# Patient Record
Sex: MALE | Race: WHITE | Employment: PART TIME | URBAN - METROPOLITAN AREA
[De-identification: names, ages, dates, MRNs, and addresses within clinical notes are randomized per-mention and may not be internally consistent; named-entity substitution may affect disease eponyms.]

---

## 2018-06-18 ENCOUNTER — HOSPITAL ENCOUNTER (OUTPATIENT)
Age: 73
Setting detail: OUTPATIENT SURGERY
Discharge: HOME | End: 2018-06-19
Attending: EMERGENCY MEDICINE | Admitting: COLON & RECTAL SURGERY
Payer: MEDICARE

## 2018-06-18 ENCOUNTER — HOSPITAL ENCOUNTER (OUTPATIENT)
Dept: GENERAL RADIOLOGY | Age: 73
Discharge: HOME | End: 2018-06-18
Payer: MEDICARE

## 2018-06-18 ENCOUNTER — ANESTHESIA EVENT (OUTPATIENT)
Dept: SURGERY | Age: 73
End: 2018-06-18
Payer: MEDICARE

## 2018-06-18 ENCOUNTER — ANESTHESIA (OUTPATIENT)
Dept: SURGERY | Age: 73
End: 2018-06-18
Payer: MEDICARE

## 2018-06-18 ENCOUNTER — APPOINTMENT (OUTPATIENT)
Dept: GENERAL RADIOLOGY | Age: 73
End: 2018-06-18
Attending: COLON & RECTAL SURGERY
Payer: MEDICARE

## 2018-06-18 DIAGNOSIS — T18.5XXA FOREIGN BODY IN ANUS AND RECTUM: ICD-10-CM

## 2018-06-18 DIAGNOSIS — T18.5XXA: Primary | ICD-10-CM

## 2018-06-18 LAB
ALBUMIN SERPL-MCNC: 3.7 G/DL (ref 3.5–5)
ALBUMIN/GLOB SERPL: 1 {RATIO} (ref 1.1–2.2)
ALP SERPL-CCNC: 77 U/L (ref 45–117)
ALT SERPL-CCNC: 27 U/L (ref 12–78)
ANION GAP SERPL CALC-SCNC: 9 MMOL/L (ref 5–15)
AST SERPL-CCNC: 22 U/L (ref 15–37)
ATRIAL RATE: 46 BPM
BASOPHILS # BLD: 0 K/UL (ref 0–0.1)
BASOPHILS NFR BLD: 0 % (ref 0–1)
BILIRUB SERPL-MCNC: 0.7 MG/DL (ref 0.2–1)
BUN SERPL-MCNC: 14 MG/DL (ref 6–20)
BUN/CREAT SERPL: 15 (ref 12–20)
CALCIUM SERPL-MCNC: 8.3 MG/DL (ref 8.5–10.1)
CALCULATED P AXIS, ECG09: -6 DEGREES
CALCULATED R AXIS, ECG10: -42 DEGREES
CALCULATED T AXIS, ECG11: -12 DEGREES
CHLORIDE SERPL-SCNC: 105 MMOL/L (ref 97–108)
CO2 SERPL-SCNC: 26 MMOL/L (ref 21–32)
CREAT SERPL-MCNC: 0.91 MG/DL (ref 0.7–1.3)
DIAGNOSIS, 93000: NORMAL
DIFFERENTIAL METHOD BLD: NORMAL
EOSINOPHIL # BLD: 0.2 K/UL (ref 0–0.4)
EOSINOPHIL NFR BLD: 2 % (ref 0–7)
ERYTHROCYTE [DISTWIDTH] IN BLOOD BY AUTOMATED COUNT: 13.2 % (ref 11.5–14.5)
GLOBULIN SER CALC-MCNC: 3.7 G/DL (ref 2–4)
GLUCOSE SERPL-MCNC: 87 MG/DL (ref 65–100)
HCT VFR BLD AUTO: 40.4 % (ref 36.6–50.3)
HGB BLD-MCNC: 13.4 G/DL (ref 12.1–17)
IMM GRANULOCYTES # BLD: 0 K/UL (ref 0–0.04)
IMM GRANULOCYTES NFR BLD AUTO: 0 % (ref 0–0.5)
LYMPHOCYTES # BLD: 2 K/UL (ref 0.8–3.5)
LYMPHOCYTES NFR BLD: 23 % (ref 12–49)
MCH RBC QN AUTO: 31.1 PG (ref 26–34)
MCHC RBC AUTO-ENTMCNC: 33.2 G/DL (ref 30–36.5)
MCV RBC AUTO: 93.7 FL (ref 80–99)
MONOCYTES # BLD: 0.8 K/UL (ref 0–1)
MONOCYTES NFR BLD: 9 % (ref 5–13)
NEUTS SEG # BLD: 5.6 K/UL (ref 1.8–8)
NEUTS SEG NFR BLD: 65 % (ref 32–75)
NRBC # BLD: 0 K/UL (ref 0–0.01)
NRBC BLD-RTO: 0 PER 100 WBC
P-R INTERVAL, ECG05: 154 MS
PLATELET # BLD AUTO: 189 K/UL (ref 150–400)
PMV BLD AUTO: 11 FL (ref 8.9–12.9)
POTASSIUM SERPL-SCNC: 3.9 MMOL/L (ref 3.5–5.1)
PROT SERPL-MCNC: 7.4 G/DL (ref 6.4–8.2)
Q-T INTERVAL, ECG07: 470 MS
QRS DURATION, ECG06: 82 MS
QTC CALCULATION (BEZET), ECG08: 411 MS
RBC # BLD AUTO: 4.31 M/UL (ref 4.1–5.7)
SODIUM SERPL-SCNC: 140 MMOL/L (ref 136–145)
VENTRICULAR RATE, ECG03: 46 BPM
WBC # BLD AUTO: 8.7 K/UL (ref 4.1–11.1)

## 2018-06-18 PROCEDURE — 77030032490 HC SLV COMPR SCD KNE COVD -B: Performed by: COLON & RECTAL SURGERY

## 2018-06-18 PROCEDURE — 77030018836 HC SOL IRR NACL ICUM -A: Performed by: COLON & RECTAL SURGERY

## 2018-06-18 PROCEDURE — 36415 COLL VENOUS BLD VENIPUNCTURE: CPT | Performed by: EMERGENCY MEDICINE

## 2018-06-18 PROCEDURE — 74019 RADEX ABDOMEN 2 VIEWS: CPT

## 2018-06-18 PROCEDURE — 93005 ELECTROCARDIOGRAM TRACING: CPT | Performed by: ANESTHESIOLOGY

## 2018-06-18 PROCEDURE — 80053 COMPREHEN METABOLIC PANEL: CPT | Performed by: EMERGENCY MEDICINE

## 2018-06-18 PROCEDURE — 77030005546 HC CATH URETH FOL 3W BARD -A: Performed by: COLON & RECTAL SURGERY

## 2018-06-18 PROCEDURE — 76010000149 HC OR TIME 1 TO 1.5 HR: Performed by: COLON & RECTAL SURGERY

## 2018-06-18 PROCEDURE — 74011250636 HC RX REV CODE- 250/636

## 2018-06-18 PROCEDURE — 77030010545: Performed by: COLON & RECTAL SURGERY

## 2018-06-18 PROCEDURE — 76210000022 HC REC RM PH II 1.5 TO 2 HR: Performed by: COLON & RECTAL SURGERY

## 2018-06-18 PROCEDURE — 76060000033 HC ANESTHESIA 1 TO 1.5 HR: Performed by: COLON & RECTAL SURGERY

## 2018-06-18 PROCEDURE — 99285 EMERGENCY DEPT VISIT HI MDM: CPT

## 2018-06-18 PROCEDURE — 76210000000 HC OR PH I REC 2 TO 2.5 HR: Performed by: COLON & RECTAL SURGERY

## 2018-06-18 PROCEDURE — 71045 X-RAY EXAM CHEST 1 VIEW: CPT

## 2018-06-18 PROCEDURE — 85025 COMPLETE CBC W/AUTO DIFF WBC: CPT | Performed by: EMERGENCY MEDICINE

## 2018-06-18 PROCEDURE — 77030014006 HC SPNG HEMSTAT J&J -A: Performed by: COLON & RECTAL SURGERY

## 2018-06-18 PROCEDURE — 74011250636 HC RX REV CODE- 250/636: Performed by: ANESTHESIOLOGY

## 2018-06-18 PROCEDURE — 74011000250 HC RX REV CODE- 250

## 2018-06-18 PROCEDURE — 77030007866 HC KT SPN ANES BBMI -B: Performed by: ANESTHESIOLOGY

## 2018-06-18 RX ORDER — MIDAZOLAM HYDROCHLORIDE 1 MG/ML
0.5 INJECTION, SOLUTION INTRAMUSCULAR; INTRAVENOUS
Status: DISCONTINUED | OUTPATIENT
Start: 2018-06-18 | End: 2018-06-18

## 2018-06-18 RX ORDER — SODIUM CHLORIDE, SODIUM LACTATE, POTASSIUM CHLORIDE, CALCIUM CHLORIDE 600; 310; 30; 20 MG/100ML; MG/100ML; MG/100ML; MG/100ML
100 INJECTION, SOLUTION INTRAVENOUS CONTINUOUS
Status: DISCONTINUED | OUTPATIENT
Start: 2018-06-18 | End: 2018-06-19 | Stop reason: HOSPADM

## 2018-06-18 RX ORDER — FENTANYL CITRATE 50 UG/ML
25 INJECTION, SOLUTION INTRAMUSCULAR; INTRAVENOUS
Status: DISCONTINUED | OUTPATIENT
Start: 2018-06-18 | End: 2018-06-18

## 2018-06-18 RX ORDER — ACETAMINOPHEN 500 MG
1000 TABLET ORAL ONCE
Status: COMPLETED | OUTPATIENT
Start: 2018-06-18 | End: 2018-06-19

## 2018-06-18 RX ORDER — SODIUM CHLORIDE 0.9 % (FLUSH) 0.9 %
5-10 SYRINGE (ML) INJECTION AS NEEDED
Status: DISCONTINUED | OUTPATIENT
Start: 2018-06-18 | End: 2018-06-19 | Stop reason: HOSPADM

## 2018-06-18 RX ORDER — LIDOCAINE HYDROCHLORIDE 10 MG/ML
0.1 INJECTION, SOLUTION EPIDURAL; INFILTRATION; INTRACAUDAL; PERINEURAL AS NEEDED
Status: DISCONTINUED | OUTPATIENT
Start: 2018-06-18 | End: 2018-06-18 | Stop reason: HOSPADM

## 2018-06-18 RX ORDER — SODIUM CHLORIDE 0.9 % (FLUSH) 0.9 %
5-10 SYRINGE (ML) INJECTION AS NEEDED
Status: DISCONTINUED | OUTPATIENT
Start: 2018-06-18 | End: 2018-06-18 | Stop reason: HOSPADM

## 2018-06-18 RX ORDER — SODIUM CHLORIDE 0.9 % (FLUSH) 0.9 %
5-10 SYRINGE (ML) INJECTION EVERY 8 HOURS
Status: DISCONTINUED | OUTPATIENT
Start: 2018-06-18 | End: 2018-06-18 | Stop reason: HOSPADM

## 2018-06-18 RX ORDER — BUPIVACAINE HYDROCHLORIDE AND EPINEPHRINE 2.5; 5 MG/ML; UG/ML
30 INJECTION, SOLUTION EPIDURAL; INFILTRATION; INTRACAUDAL; PERINEURAL ONCE
Status: DISCONTINUED | OUTPATIENT
Start: 2018-06-18 | End: 2018-06-18 | Stop reason: HOSPADM

## 2018-06-18 RX ORDER — MIDAZOLAM HYDROCHLORIDE 1 MG/ML
1 INJECTION, SOLUTION INTRAMUSCULAR; INTRAVENOUS AS NEEDED
Status: DISCONTINUED | OUTPATIENT
Start: 2018-06-18 | End: 2018-06-18 | Stop reason: HOSPADM

## 2018-06-18 RX ORDER — FENTANYL CITRATE 50 UG/ML
50 INJECTION, SOLUTION INTRAMUSCULAR; INTRAVENOUS AS NEEDED
Status: DISCONTINUED | OUTPATIENT
Start: 2018-06-18 | End: 2018-06-18 | Stop reason: HOSPADM

## 2018-06-18 RX ORDER — ROPIVACAINE HYDROCHLORIDE 5 MG/ML
150 INJECTION, SOLUTION EPIDURAL; INFILTRATION; PERINEURAL AS NEEDED
Status: DISCONTINUED | OUTPATIENT
Start: 2018-06-18 | End: 2018-06-18 | Stop reason: HOSPADM

## 2018-06-18 RX ORDER — BUPIVACAINE HYDROCHLORIDE 7.5 MG/ML
INJECTION, SOLUTION EPIDURAL; RETROBULBAR AS NEEDED
Status: DISCONTINUED | OUTPATIENT
Start: 2018-06-18 | End: 2018-06-18 | Stop reason: HOSPADM

## 2018-06-18 RX ORDER — SODIUM CHLORIDE, SODIUM LACTATE, POTASSIUM CHLORIDE, CALCIUM CHLORIDE 600; 310; 30; 20 MG/100ML; MG/100ML; MG/100ML; MG/100ML
100 INJECTION, SOLUTION INTRAVENOUS CONTINUOUS
Status: DISCONTINUED | OUTPATIENT
Start: 2018-06-18 | End: 2018-06-18 | Stop reason: HOSPADM

## 2018-06-18 RX ORDER — ACETAMINOPHEN 500 MG
1000 TABLET ORAL ONCE
Status: DISCONTINUED | OUTPATIENT
Start: 2018-06-19 | End: 2018-06-18

## 2018-06-18 RX ORDER — DIPHENHYDRAMINE HYDROCHLORIDE 50 MG/ML
12.5 INJECTION, SOLUTION INTRAMUSCULAR; INTRAVENOUS AS NEEDED
Status: ACTIVE | OUTPATIENT
Start: 2018-06-18 | End: 2018-06-18

## 2018-06-18 RX ADMIN — SODIUM CHLORIDE, SODIUM LACTATE, POTASSIUM CHLORIDE, AND CALCIUM CHLORIDE 100 ML/HR: 600; 310; 30; 20 INJECTION, SOLUTION INTRAVENOUS at 21:46

## 2018-06-18 RX ADMIN — BUPIVACAINE HYDROCHLORIDE 7 MG: 7.5 INJECTION, SOLUTION EPIDURAL; RETROBULBAR at 21:59

## 2018-06-18 NOTE — ED PROVIDER NOTES
HPI Comments: 71 y.o. male with past medical history significant for DJD who presents from home via private vehicle with chief complaint of rectal pain. Pt reports putting an electric nose clipper, without the battery, up his rectum 2 days ago and has not been able to retrieve it. Pt reports mild discomfort, but no significant pain. Pt reports having a pre-established appointment today at his PCP's office, at which he mentioned the object, had an X-Ray, and was referred here. Pt reports trouble passing his stool, but denies any nausea or vomiting. Pt denies any abdominal pain, fever, chills, or diaphoresis. There are no other acute medical concerns at this time. Social hx: Former smoker; Occasional EtOH use; Benzodiazepine use  PCP: Anselm Primrose, MD    Note written by Cosme Owen, as dictated by Leonor Mendez MD 2:43 PM    The history is provided by the patient. No  was used. Past Medical History:   Diagnosis Date    B12 deficiency     H/O    Chicken pox     DJD (degenerative joint disease)     Hypercholesterolemia     Sciatica     Shingles     H/O       Past Surgical History:   Procedure Laterality Date    ENDOSCOPY, COLON, DIAGNOSTIC  2003    normal    HX HEENT      repair nasal fx         Family History:   Problem Relation Age of Onset    Thyroid Disease Mother      hypothyroidism    Hypertension Mother     Thyroid Disease Sister      hypothyroidism    Diabetes Father     Hypertension Father     Stroke Father        Social History     Social History    Marital status: SINGLE     Spouse name: N/A    Number of children: N/A    Years of education: N/A     Occupational History    Not on file.      Social History Main Topics    Smoking status: Former Smoker    Smokeless tobacco: Never Used    Alcohol use 0.0 oz/week     0 Standard drinks or equivalent per week      Comment: 1-2    Drug use: Yes     Special: Benzodiazepines    Sexual activity: Not Currently     Other Topics Concern    Not on file     Social History Narrative         ALLERGIES: Pcn [penicillins]    Review of Systems   Constitutional: Negative for activity change, appetite change, chills, diaphoresis, fatigue and fever. HENT: Negative for ear pain, facial swelling, sore throat and trouble swallowing. Eyes: Negative for pain, discharge and visual disturbance. Respiratory: Negative for chest tightness, shortness of breath and wheezing. Cardiovascular: Negative for chest pain and palpitations. Gastrointestinal: Positive for rectal pain. Negative for abdominal pain, blood in stool, nausea and vomiting. Genitourinary: Negative for difficulty urinating, flank pain and hematuria. Musculoskeletal: Negative for arthralgias, joint swelling, myalgias and neck pain. Skin: Negative for color change and rash. Neurological: Negative for dizziness, weakness, numbness and headaches. Hematological: Negative for adenopathy. Does not bruise/bleed easily. Psychiatric/Behavioral: Negative for behavioral problems, confusion and sleep disturbance. All other systems reviewed and are negative. Vitals:    06/18/18 1403   BP: 101/65   Pulse: (!) 51   Resp: 15   Temp: 97.9 °F (36.6 °C)   SpO2: 97%   Weight: 73.5 kg (162 lb)   Height: 5' 6\" (1.676 m)            Physical Exam   Constitutional: He is oriented to person, place, and time. He appears well-developed and well-nourished. No distress. HENT:   Head: Normocephalic and atraumatic. Nose: Nose normal.   Mouth/Throat: Oropharynx is clear and moist.   Eyes: Conjunctivae and EOM are normal. Pupils are equal, round, and reactive to light. No scleral icterus. Neck: Normal range of motion. Neck supple. No JVD present. No tracheal deviation present. No thyromegaly present. No carotid bruits noted. Cardiovascular: Normal rate, regular rhythm, normal heart sounds and intact distal pulses.   Exam reveals no gallop and no friction rub. No murmur heard. Pulmonary/Chest: Effort normal and breath sounds normal. No respiratory distress. He has no wheezes. He has no rales. He exhibits no tenderness. Abdominal: Soft. Bowel sounds are normal. He exhibits no distension and no mass. There is no tenderness. There is no rebound and no guarding. Genitourinary:   Genitourinary Comments: Could not visualize the object. Musculoskeletal: Normal range of motion. He exhibits no edema or tenderness. Lymphadenopathy:     He has no cervical adenopathy. Neurological: He is alert and oriented to person, place, and time. He has normal reflexes. No cranial nerve deficit. Coordination normal.   Skin: Skin is warm and dry. No rash noted. No erythema. Psychiatric: He has a normal mood and affect. His behavior is normal. Judgment and thought content normal.   Nursing note and vitals reviewed. Note written by Cosme Barros, as dictated by Yuval Valle MD 2:43 PM    MDM  Number of Diagnoses or Management Options     Amount and/or Complexity of Data Reviewed  Clinical lab tests: ordered and reviewed  Tests in the radiology section of CPT®: ordered and reviewed  Decide to obtain previous medical records or to obtain history from someone other than the patient: yes  Review and summarize past medical records: yes  Discuss the patient with other providers: yes  Independent visualization of images, tracings, or specimens: yes    Risk of Complications, Morbidity, and/or Mortality  Presenting problems: low  Diagnostic procedures: low  Management options: low    Patient Progress  Patient progress: stable        ED Course       Procedures      PROGRESS NOTE:  2:53 PM  Reviewed pt's X-Ray. Will consult colo-rectal and will do a rectal exam, but am hesitant to attempt retrieval at this moment, due to where the object is located. CONSULT NOTE:  3:46 PM Yuval Valle MD spoke with Dr. Fred Barrios, Consult for [facility]. Discussed available diagnostic tests and clinical findings. Dr. Nadya Romero will be by to see the pt this evening, around 1700. PROGRESS NOTE:  5:53 PM  Dr. Nadya Romero in ED, has seen the pt. He would like to remove the object in the OR with a spinal, which is not available until after 2000. States the pt can wait here in a stretcher, does not need to be admitted.

## 2018-06-18 NOTE — ED NOTES
2:00 PM  I have evaluated the patient as the Provider in Triage. I have reviewed His vital signs and the triage nurse assessment. I have talked with the patient and any available family and advised that I am the provider in triage and have ordered the appropriate study to initiate their work up based on the clinical presentation during my assessment. I have advised that the patient will be accommodated in the Main ED as soon as possible. I have also requested to contact the triage nurse or myself immediately if the patient experiences any changes in their condition during this brief waiting period. Beena Wilhelm NP    Pt states that he put an electric nose clipper in his rectum on Saturday. He went to his PCP office this morning and was sent for an outpatient xray which verified the foreign body in rectum. Pt denies any pain.

## 2018-06-18 NOTE — IP AVS SNAPSHOT
[address] 
[phone] Patient: Eneida Lama MRN: [id number] A check regina indicates which time of day the medication should be taken. My Medications CONTINUE taking these medications Instructions Each Dose to Equal  
 Morning Noon Evening Bedtime B COMPLEX 1 tablet Generic drug:  b complex vitamins Your last dose was: Your next dose is: Take 1 Tab by mouth daily. 1 Tab  
    
   
   
   
  
 cetirizine 10 mg tablet Commonly known as:  ZYRTEC Your last dose was: Your next dose is: Take 1 Tab by mouth daily. 10 mg  
    
   
   
   
  
 fexofenadine 180 mg tablet Commonly known as:  Harjinder Toth Your last dose was: Your next dose is: Take 1 Tab by mouth daily. 180 mg  
    
   
   
   
  
 fluocinoNIDE 0.05 % ointment Commonly known as:  LIDEX Your last dose was: Your next dose is:    
   
   
 Apply  to affected area two (2) times a day. mometasone 0.1 % topical cream  
Commonly known as:  Neva Christelle Your last dose was: Your next dose is:    
   
   
      
   
   
   
  
 TINACTIN 1 % Spra Generic drug:  tolnaftate Your last dose was: Your next dose is:    
   
   
 by Apply Externally route. STOP taking these medications   
 aspirin delayed-release 81 mg tablet Commonly known as:  Aspirin EC  
   
  
 FISH OIL 1,000 mg Cap Generic drug:  omega-3 fatty acids-vitamin e  
   
  
 niacin 1,000 mg Tb24 tab Commonly known as:  Tyler Brigitte

## 2018-06-18 NOTE — IP AVS SNAPSHOT
[address] 
[phone] Patient: Maynor Jordan MRN: [id number] [id number] About your hospitalization You were admitted on:  N/A You last received care in the:  [facility] PACU You were discharged on:  June 19, 2018 Why you were hospitalized Your primary diagnosis was:  Foreign Body In Anus And Rectum Follow-up Information Follow up With Details Comments Contact Info Derian Bueno, [address] [facility] [address] [address] 
[phone] Discharge Orders None A check regina indicates which time of day the medication should be taken. My Medications CONTINUE taking these medications Instructions Each Dose to Equal  
 Morning Noon Evening Bedtime B COMPLEX 1 tablet Generic drug:  b complex vitamins Your last dose was: Your next dose is: Take 1 Tab by mouth daily. 1 Tab  
    
   
   
   
  
 cetirizine 10 mg tablet Commonly known as:  ZYRTEC Your last dose was: Your next dose is: Take 1 Tab by mouth daily. 10 mg  
    
   
   
   
  
 fexofenadine 180 mg tablet Commonly known as:  Leona Lapidus Your last dose was: Your next dose is: Take 1 Tab by mouth daily. 180 mg  
    
   
   
   
  
 fluocinoNIDE 0.05 % ointment Commonly known as:  LIDEX Your last dose was: Your next dose is:    
   
   
 Apply  to affected area two (2) times a day. mometasone 0.1 % topical cream  
Commonly known as:  Ana Saupe Your last dose was: Your next dose is:    
   
   
      
   
   
   
  
 TINACTIN 1 % Spra Generic drug:  tolnaftate Your last dose was: Your next dose is:    
   
   
 by Apply Externally route. STOP taking these medications   
 aspirin delayed-release 81 mg tablet Commonly known as:  Aspirin EC  
   
  
 FISH OIL 1,000 mg Cap Generic drug:  omega-3 fatty acids-vitamin e  
   
  
 niacin 1,000 mg Tb24 tab Commonly known as:  Esmeralda Nina Discharge Instructions Post-Operative Instructions 1. Diet:  Consume a high fiber diet as tolerated. Such a diet may include fresh fruits, vegetables, and whole grain cereals and breads. You should also drink 8-10 glasses of water, juice, or other non-alcoholic beverages per day. 2. Fiber Supplements:  Take 1 dose of Metamucil or other over-the-counter fiber supplement (Citrucel, BeneFiber, etc.) as directed each morning and another dose each evening. 3. Medications: For pain, take Tylenol (acetaminophen) 1000 mg every 6 hours as needed. Take docusate (non-prescription stool softener) 100 mg twice per day for the next week. Beginning on the second day after surgery, you may take ibuprofen as directed in addition to or instead of the prescription medication if there has been no significant bleeding. Do not take pain medication on an empty stomach. Do not take aspirin, fish oil, or niacin for 10 days following the procedure. 4. Activity:  Do not engage in any heavy lifting or other strenuous activity for the next 3 days. You may walk as much as you want, and you may climb stairs. Frequent walking will help prevent constipation. 5. Sitz Baths (soaking):  Beginning in the morning or after your next bowel movement, perform sitz baths 3 times per day and after bowel movements. The water should be very warm, and you should soak for 10 to 20 minutes at a time. The sitz baths are partly for hygiene and partly for comfort. For the next few days, do not wipe the anal area with dry toilet tissue; instead, use baby wipes. You may shower as you normally would. If desired, you may apply Neosporin, Neosporin + Pain Relief, or a non-prescription topical anesthetic such as Nupercainal ointment to the anus. 6. Constipation:  If more than one day passes without a bowel movement, take 1 Tablespoon of Milk of Magnesia. Repeat in 6 hours if you have no results. If taking the second dose of the Milk of Magnesia does not result in a bowel movement, take two 5 mg Dulcolax (bisacodyl) tablets. 7. Bleeding:  A small amount of bright red bleeding can be expected for the next several days. If bleeding appears to be continuous, call my office. 8. Other Problems:  If you experience intolerable pain, fever (temperature of 101 or more), or inability to urinate, call my office. 9. Questions: If you have any questions about your post-operative condition or care, call my office. 10. Work:  You may return to work when feeling able. 11. Follow-up:  No planned follow-up is needed. If you are having problems, you should call my office. Gaby Camacho M.D. 
[phone] Introducing [facility]! Kayla Gustafson introduces [employer] patient portal. Now you can access parts of your medical record, email your doctor's office, and request medication refills online. 1. In your internet browser, go to [email or web address] 2. Click on the First Time User? Click Here link in the Sign In box. You will see the New Member Sign Up page. 3. Enter your [employer] Access Code exactly as it appears below. You will not need to use this code after youve completed the sign-up process. If you do not sign up before the expiration date, you must request a new code. · [employer] Access Code: [email or web address] Expires: 9/16/2018  2:04 PM 
 
4. Enter the last four digits of your Social Security Number (xxxx) and Date of Birth (mm/dd/yyyy) as indicated and click Submit. You will be taken to the next sign-up page. 5. Create a Zood[employer] ID. This will be your [employer] login ID and cannot be changed, so think of one that is secure and easy to remember. 6. Create a [employer] password. You can change your password at any time. 7. Enter your Password Reset Question and Answer. This can be used at a later time if you forget your password. 8. Enter your e-mail address. You will receive e-mail notification when new information is available in [address] 9. Click Sign Up. You can now view and download portions of your medical record. 10. Click the Download Summary menu link to download a portable copy of your medical information. If you have questions, please visit the Frequently Asked Questions section of the [employer] website. Remember, [employer] is NOT to be used for urgent needs. For medical emergencies, dial 911. Now available from your iPhone and Android! Introducing Allen Schmidt As a [facility] patient, I wanted to make you aware of our electronic visit tool called Allen Schmidt. [employer]/7 allows you to connect within minutes with a medical provider 24 hours a day, seven days a week via a mobile device or tablet or logging into a secure website from your computer. You can access Allen Schmidt from anywhere in the [place]. A virtual visit might be right for you when you have a simple condition and feel like you just dont want to get out of bed, or cant get away from work for an appointment, when your regular [facility] provider is not available (evenings, weekends or holidays), or when youre out of town and need minor care. Electronic visits cost only $49 and if the [facility] JRKICKZ/FlexMinder provider determines a prescription is needed to treat your condition, one can be electronically transmitted to a nearby pharmacy*. Please take a moment to enroll today if you have not already done so. The enrollment process is free and takes just a few minutes. To enroll, please download the [employer] ivan to your tablet or phone, or visit [email or web address] to enroll on your computer. And, as an [address] [place] [address] patient with a [facility] account, the results of your visits will be scanned into your electronic medical record and your primary care provider will be able to view the scanned results. We urge you to continue to see your regular Loda Lessen provider for your ongoing medical care. And while your primary care provider may not be the one available when you seek a [employer] virtual visit, the peace of mind you get from getting a real diagnosis real time can be priceless. For more information on Allen Amaxa [employer]elia[employer], view our Frequently Asked Questions (FAQs) at [email or web address]. Sincerely, 
 
Finesse Eubanks MD 
Chief Medical Officer Austin Geno Islas *:  certain medications cannot be prescribed via [employer] Unresulted tests-please follow up with your PCP on these results Procedure/Test Authorizing Provider CBC WITH AUTOMATED DIFF Rizwan Patel MD  
 EKG, 12 LEAD, INITIAL Bee Taylor MD  
 METABOLIC PANEL, COMPREHENSIVE Rizwan Patel MD  
 SAMPLES BEING HELD Rizwan Patel MD  
 XR Lucrecia Cardona MD  
  
Providers Seen During Your Hospitalization Provider Specialty Primary office phone Rizwan Ptael MD Emergency Medicine [phone] Your Primary Care Physician (PCP) Primary Care Physician Office Phone Office Fax Arbie Needs [phone] You are allergic to the following Allergen Reactions Pcn (Penicillins) Other (comments) Red man syndrome IV form only Recent Documentation Height Weight BMI Smoking Status 1.676 m 73.5 kg 26.15 kg/m2 Former Smoker Emergency Contacts Name Discharge Info Relation [facility] [3] Other Relative [6] [phone] Patient Belongings The following personal items are in your possession at time of discharge: Dental Appliances:  (permenant)  Visual Aid: None          Jewelry: None  Clothing:  (belongings to PACU)       Personal Items Sent to Safe: watch, keys, cellphone, credit card x2 Please provide this summary of care documentation to your next provider. Signatures-by signing, you are acknowledging that this After Visit Summary has been reviewed with you and you have received a copy. Patient Signature:  ____________________________________________________________ Date:  ____________________________________________________________  
  
[facility] Provider Signature:  ____________________________________________________________ Date:  ____________________________________________________________

## 2018-06-18 NOTE — ED TRIAGE NOTES
Pt c/o rectal pain. Pt inserted electric nose clippers on Saturday in rectum and was not able to get it out. Pt had XR ordered from PCP office, Dr. Irwin Ricks, and was referred to ED.  Last BM yesterday

## 2018-06-18 NOTE — CONSULTS
Colorectal Surgery Consultation/Pre-Operative History and Physical Examination Note          NAME:  Amanda Robertson   :   516   MRN:   [id number]     Referring Physician:  Herlinda Street MD    Consultation Date: 2018    Chief Complaint:  Rectal foreign body. History of Present Illness: The patient is a 18-year-old male who inserted a battery operated nose hair shawna (without the batteries) into his rectum. He has since then been unable to get it out. He has no real discomfort, but his bowel movements have been less voluminous than normal.  He denies seeing any blood or having any fever or chills. He was evaluated elsewhere earlier today and then sent to the ER here. PMH:  Past Medical History:   Diagnosis Date    B12 deficiency     H/O    Chicken pox     DJD (degenerative joint disease)     Hypercholesterolemia     Sciatica        PSH:  Past Surgical History:   Procedure Laterality Date    ENDOSCOPY, COLON, DIAGNOSTIC      normal    HX COLONOSCOPY      Dr. Lucero Conteh.  HX HEENT      Repair nasal fx; circa .  HX OTHER SURGICAL      Left chest tube for traumatic pneumothorax; circa .  HX TONSILLECTOMY      Circa age 9. Home Medications:  Prior to Admission Medications   Prescriptions Last Dose Informant Patient Reported? Taking?   aspirin delayed-release (ASPIRIN EC) 81 mg tablet   No No   Sig: Take 1 Tab by mouth daily. b complex vitamins (B COMPLEX 1) tablet   Yes No   Sig: Take 1 Tab by mouth daily. cetirizine (ZYRTEC) 10 mg tablet   Yes No   Sig: Take 1 Tab by mouth daily. fexofenadine (ALLEGRA) 180 mg tablet   Yes No   Sig: Take 1 Tab by mouth daily. fluocinoNIDE (LIDEX) 0.05 % ointment   No No   Sig: Apply  to affected area two (2) times a day. mometasone (ELOCON) 0.1 % topical cream   Yes No   niacin (NIASPAN) 1,000 mg Tb24 tab   Yes No   Sig: Take  by mouth nightly.    omega-3 fatty acids-vitamin e (FISH OIL) 1,000 mg cap   Yes No   Sig: Take 1 capsule by mouth.   tolnaftate (TINACTIN) 1 % spra   Yes No   Sig: by Apply Externally route. Facility-Administered Medications: None       Hospital Medications:  No current facility-administered medications for this encounter. Current Outpatient Prescriptions   Medication Sig    mometasone (ELOCON) 0.1 % topical cream     omega-3 fatty acids-vitamin e (FISH OIL) 1,000 mg cap Take 1 capsule by mouth.  tolnaftate (TINACTIN) 1 % spra by Apply Externally route.  fluocinoNIDE (LIDEX) 0.05 % ointment Apply  to affected area two (2) times a day.  fexofenadine (ALLEGRA) 180 mg tablet Take 1 Tab by mouth daily.  b complex vitamins (B COMPLEX 1) tablet Take 1 Tab by mouth daily.  aspirin delayed-release (ASPIRIN EC) 81 mg tablet Take 1 Tab by mouth daily.  cetirizine (ZYRTEC) 10 mg tablet Take 1 Tab by mouth daily.  niacin (NIASPAN) 1,000 mg Tb24 tab Take  by mouth nightly. Allergies: Allergies   Allergen Reactions    Pcn [Penicillins] Other (comments)     Red man syndrome  IV form only       Family History:  Family History   Problem Relation Age of Onset    Thyroid Disease Mother      hypothyroidism    Hypertension Mother     Thyroid Disease Sister      hypothyroidism    Diabetes Father     Hypertension Father     Stroke Father        Social History:  Social History   Substance Use Topics    Smoking status: Former Smoker    Smokeless tobacco: Never Used    Alcohol use 0.0 oz/week     0 Standard drinks or equivalent per week      Comment: 1-2       Review of Systems:    Symptom Y/N Comments  Symptom Y/N Comments   Fever/Chills N   Chest Pain N    Cough    Abdominal Pain N    Sputum    Joint Pain     SOB/COFFEY N   Pruritis/Rash     Nausea/vomit N   Tolerating PT/OT     Diarrhea N   Tolerating Diet  NPO since 9:30 AM today.    Constipation Y   Other       Could NOT obtain due to:        Objective:     Patient Vitals for the past 8 hrs:   BP Temp Pulse Resp SpO2 Height Weight   06/18/18 1403 101/65 97.9 °F (36.6 °C) (!) 51 15 97 % 5' 6\" (1.676 m) 73.5 kg (162 lb)             PHYSICAL EXAMINATION:    GENERAL:  No distress. HEENT:  Anicteric. LUNGS:  Clear to auscultation bilaterally. HEART:  Regular rate and rhythm with no murmurs, gallops, or rubs. ABDOMEN:  Soft. Non-tender. Non-distended. No palpable masses  PERINEUM:  Grossly normal anal closure. Stool and a foreign body palpable within the distal rectum on digital examination. No gross blood. NEURO:  Alert and oriented. Data Review     Recent Labs      06/18/18   1622   WBC  8.7   HGB  13.4   HCT  40.4   PLT  189     Recent Labs      06/18/18   1622   NA  140   K  3.9   CL  105   CO2  26   BUN  14   CREA  0.91   GLU  87   CA  8.3*     Recent Labs      06/18/18   1622   SGOT  22   AP  77   TP  7.4   ALB  3.7   GLOB  3.7     No results for input(s): INR, PTP, APTT in the last 72 hours. No lab exists for component: INREXT, INREXT     Abdominal Radiographs (6/18/2018): Foreign body in rectum. Assessment and Plan:      The patient  Has a foreign body in the rectum that can hopefully be extracted transanally. I have recommended attempting this procedure in the operating room with spinal anesthesia and no sedation so that the patient can push if necessary. We have discussed the risks of the procedure, including but not limited to bleeding, infection, and injury to the colon, rectum, and/or anal sphincters possibly requiring laparotomy and the creation of a colostomy. He appeared to have understood and he has agreed to proceed. He has therefore been added on to the surgery list for tonight.     Principal Problem:    Foreign body in anus and rectum (6/18/2018)

## 2018-06-19 VITALS
SYSTOLIC BLOOD PRESSURE: 121 MMHG | HEIGHT: 66 IN | RESPIRATION RATE: 17 BRPM | HEART RATE: 52 BPM | TEMPERATURE: 97.7 F | OXYGEN SATURATION: 96 % | BODY MASS INDEX: 26.03 KG/M2 | DIASTOLIC BLOOD PRESSURE: 76 MMHG | WEIGHT: 162 LBS

## 2018-06-19 PROCEDURE — 51798 US URINE CAPACITY MEASURE: CPT

## 2018-06-19 PROCEDURE — 88300 SURGICAL PATH GROSS: CPT | Performed by: EMERGENCY MEDICINE

## 2018-06-19 PROCEDURE — 74011250637 HC RX REV CODE- 250/637: Performed by: COLON & RECTAL SURGERY

## 2018-06-19 RX ADMIN — ACETAMINOPHEN 1000 MG: 500 TABLET, FILM COATED ORAL at 00:02

## 2018-06-19 NOTE — ANESTHESIA POSTPROCEDURE EVALUATION
Post-Anesthesia Evaluation and Assessment    Patient: Benita Jha MRN: [id number]  SSN: [id number]    YOB: 1945  Age: 71 y.o. Sex: male       Cardiovascular Function/Vital Signs  Visit Vitals    /72    Pulse (!) 51    Temp 36.5 °C (97.7 °F)    Resp 15    Ht 5' 6\" (1.676 m)    Wt 73.5 kg (162 lb)    SpO2 98%    BMI 26.15 kg/m2       Patient is status post spinal anesthesia for Procedure(s):  REMOVAL RECTAL FOREIGN BODY  RECTAL  LITHOTOMY. Nausea/Vomiting: None    Postoperative hydration reviewed and adequate. Pain:  Pain Scale 1: Numeric (0 - 10) (06/18/18 2300)  Pain Intensity 1: 0 (06/18/18 2300)   Managed    Neurological Status:   Neuro (WDL): Within Defined Limits (06/18/18 2257)  Neuro  LUE Motor Response: Purposeful (06/18/18 2257)  LLE Motor Response: No movement to any stimulus (spinal) (06/18/18 2257)  RUE Motor Response: Purposeful (06/18/18 2257)  RLE Motor Response: No movement to any stimulus (spinal) (06/18/18 2257)   At baseline    Mental Status and Level of Consciousness: Arousable    Pulmonary Status:   O2 Device: Room air (06/18/18 2258)   Adequate oxygenation and airway patent    Complications related to anesthesia: None    Post-anesthesia assessment completed.  No concerns    Signed By: Eden Up MD     June 18, 2018

## 2018-06-19 NOTE — ANESTHESIA PROCEDURE NOTES
Spinal Block    Performed by: Geovanna Dominguez  Authorized by: Geovanna Dominguez     Pre-procedure:   Indications: primary anesthetic  Preanesthetic Checklist: patient identified, risks and benefits discussed, anesthesia consent, site marked, patient being monitored and timeout performed      Spinal Block:   Patient Position:  Seated    Prep: Betadine      Location:  L3-4  Technique:  Single shot        Needle:   Needle Type:  Pencil-tip  Needle Gauge:  25 G  Attempts:  1      Events: CSF confirmed, no blood with aspiration and no paresthesia        Assessment:  Insertion:  Uncomplicated  Patient tolerance:  Patient tolerated the procedure well with no immediate complications  7.5 mg bupiv

## 2018-06-19 NOTE — ED NOTES
2000: Patient and present family updated on plan of care and treatment, verbalized understanding. No questions or needs expressed at this time. 2100: Patient aware of pending surgeon evaluation at bedside. VSS, Denies needs. 2122: Patient left department with OR staff. VSS.

## 2018-06-19 NOTE — OP NOTES
[address] [place] [address] REPORT    Kusum Tuttle  MR#: [id number]  : 1945  ACCOUNT #: [de-identified]    DATE OF SERVICE: 2018    PREOPERATIVE DIAGNOSIS:  Rectal foreign body. POSTOPERATIVE DIAGNOSIS:  Rectal foreign body. PROCEDURE PERFORMED:  Extraction of rectal foreign body. SURGEON: Jayshree Mims MD    ASSISTANT:  None. ANESTHESIA:  Spinal.    SPECIMENS REMOVED:  Rectal foreign body. TUBES AND DRAINS:  None. ESTIMATED BLOOD LOSS:  Less than 1 mL. CRYSTALLOID:  750 mL. COMPLICATIONS:  None apparent. IMPLANTS:  None. INDICATION FOR PROCEDURE:  The patient is a 71-year-old male who inserted a battery operated nose hair shawna (without the batteries) into his rectum, 2 days ago. He has since then been unable to get it out. He has no real discomfort, but his bowel movements have been less voluminous than normal.  He denies seeing any blood or having any fever or chills. He was evaluated elsewhere earlier today and then sent to the emergency room here at [facility].  Physical examination revealed a soft, nontender abdomen. Digital rectal examination allowed for palpation of the tip of the foreign body in the distal rectum. There was also a significant amount of stool and there was no gross blood. The laboratory results were unremarkable. Abdominal radiographs revealed the foreign body in the rectum. The patient was informed that the foreign body could probably be extracted transanally. In order to facilitate that procedure, he would be given a spinal anesthetic and no sedation so that he would be able to push if necessary. The risks of the surgery were discussed in detail and he agreed to proceed. OPERATIVE FINDINGS:  There was a large quantity of thick, arlette-like stool within the rectum. The foreign body was a mostly plastic nose hair shawna with no batteries inside.   There were significantly enlarged internal hemorrhoids. There was no obvious rectal injury, but rigid proctosigmoidoscopy was impossible secondary to retained stool. There was a superficial linear tear of the anterior anorectal junction. DESCRIPTION OF PROCEDURE:  After informed consent was obtained, the patient was taken to the operating room where standard monitoring devices were attached and a spinal anesthetic was administered by the anesthesiologist Alejandro Gonsalez MD.  The patient was then positioned in lithotomy with the lower extremities supported by the padded hydraulic Hammad stirrups and fitted with pneumatic compression stockings. The perineum was prepped and draped in the usual sterile fashion. Digital rectal examination was performed. The stool was encountered and the tip of the foreign body was palpated with a fingertip. A significant amount of time was spent extracting stool from the rectum. The patient was periodically asked to push in order to try to move the object closer to the sphincter complex. The pushing was done while the examining fingers were also used to try to guide the foreign body. Some progress was made with this technique, but then it was decided that it might be helpful to instill fluid to help lubricate the area and to perhaps break any vacuum effect within. A 26-Greek 3-way Jackson catheter was brought onto the field and inserted transanally into the rectum. It was not possible to guide the tip beyond the foreign body, but it was advanced for several centimeters and then it was used to copiously irrigate the rectum with sterile water. Using a combination of the irrigation, manipulation with fingers, and the patient pushing, gradually the foreign body came down into the upper part of the anal canal.  Once the tip was visible within the anal canal, it was grasped with a large Kocher clamp and traction was applied as the patient pushed.   This delivered the object, which was examined and confirmed not to contain any batteries and to be intact. Some additional stool was withdrawn. There was scant bleeding. Examination revealed the enlarged internal hemorrhoids and the skin tear described above. There was no active bleeding from the tear or the hemorrhoids. The perineum was cleaned and the anoscope was used to attempt to visualize the anal canal as well as possible. Because of the amount of retained stool, it was clear the rigid proctosigmoidoscopy would be fruitless. Three small strips of Gelfoam were inserted into the anal canal and no external dressing was applied. The patient was taken out of lithotomy. He appeared to have tolerated the procedure well. At its conclusion, he was transported in stable condition to the recovery room. In the recovery room, an upright portable chest radiograph would be obtained to evaluate for any pneumoperitoneum.           Elba Runner, MD       PG / VN  D: 06/18/2018 23:58     T: 06/19/2018 00:49  JOB #: [id number]  CC: Elba Runner MD

## 2018-06-19 NOTE — DISCHARGE INSTRUCTIONS
Post-Operative Instructions      1. Diet:  Consume a high fiber diet as tolerated. Such a diet may include fresh fruits, vegetables, and whole grain cereals and breads. You should also drink 8-10 glasses of water, juice, or other non-alcoholic beverages per day. 2. Fiber Supplements:  Take 1 dose of Metamucil or other over-the-counter fiber supplement (Citrucel, BeneFiber, etc.) as directed each morning and another dose each evening. 3. Medications: For pain, take Tylenol (acetaminophen) 1000 mg every 6 hours as needed. Take docusate (non-prescription stool softener) 100 mg twice per day for the next week. Beginning on the second day after surgery, you may take ibuprofen as directed in addition to or instead of the prescription medication if there has been no significant bleeding. Do not take pain medication on an empty stomach. Do not take aspirin, fish oil, or niacin for 10 days following the procedure. 4. Activity:  Do not engage in any heavy lifting or other strenuous activity for the next 3 days. You may walk as much as you want, and you may climb stairs. Frequent walking will help prevent constipation. 5. Sitz Baths (soaking):  Beginning in the morning or after your next bowel movement, perform sitz baths 3 times per day and after bowel movements. The water should be very warm, and you should soak for 10 to 20 minutes at a time. The sitz baths are partly for hygiene and partly for comfort. For the next few days, do not wipe the anal area with dry toilet tissue; instead, use baby wipes. You may shower as you normally would. If desired, you may apply Neosporin, Neosporin + Pain Relief, or a non-prescription topical anesthetic such as Nupercainal ointment to the anus. 6. Constipation:  If more than one day passes without a bowel movement, take 1 Tablespoon of Milk of Magnesia. Repeat in 6 hours if you have no results.   If taking the second dose of the Milk of Magnesia does not result in a bowel movement, take two 5 mg Dulcolax (bisacodyl) tablets. 7. Bleeding:  A small amount of bright red bleeding can be expected for the next several days. If bleeding appears to be continuous, call my office. 8. Other Problems:  If you experience intolerable pain, fever (temperature of 101 or more), or inability to urinate, call my office. 9. Questions: If you have any questions about your post-operative condition or care, call my office. 10. Work:  You may return to work when feeling able. 11. Follow-up:  No planned follow-up is needed. If you are having problems, you should call my office.         Dayne Neal M.D.  [phone]

## 2018-06-19 NOTE — BRIEF OP NOTE
BRIEF OPERATIVE NOTE    Date of Procedure:  6/18/18  Preoperative Diagnosis:  Rectal foreign body. Postoperative Diagnosis:  Rectal foreign body. Procedure:   Extraction of rectal foreign body. Surgeon:  Rosy Darby MD  Assistant:  None. Anesthesia:  Spinal.  Estimated Blood Loss:  < 1 mL  Crystalloid:  750 mL  Specimens:   ID Type Source Tests Collected by Time Destination   1 : Rectal foreign body Other Rectal  Rosy Darby MD 6/18/2018 2646 Pathology     Tubes and Drains:  None. Findings:  Large quantity of thick, arlette-like stool. The foreign body was a mostly plastic nose hair shawna with no batteries inside. Significantly enlarged internal hemorrhoids. No obvious rectal injury, but rigid proctosigmoidoscopy impossible secondary to retained stool. Superficial linear tear of the anterior anorectal junction. Complications:  None apparent. Implants:  None.

## 2018-06-19 NOTE — PERIOP NOTES
Patient: Parisa Carrillo MRN: [id number]  SSN: [id number]   YOB: 1945  Age: 71 y.o. Sex: male     Patient is status post Procedure(s):  REMOVAL RECTAL FOREIGN BODY  RECTAL  LITHOTOMY. Surgeon(s) and Role:     * Komal Becker MD - Primary    Local/Dose/Irrigation:  None used                  Peripheral IV 06/18/18 Left Antecubital (Active)   Site Assessment Clean, dry, & intact 6/18/2018  4:24 PM   Phlebitis Assessment 0 6/18/2018  4:24 PM   Infiltration Assessment 0 6/18/2018  4:24 PM   Dressing Status Clean, dry, & intact 6/18/2018  4:24 PM   Dressing Type Tape;Transparent 6/18/2018  4:24 PM   Hub Color/Line Status Pink;Capped;Flushed;Patent 6/18/2018  4:24 PM   Action Taken Blood drawn 6/18/2018  4:24 PM                           Dressing/Packing:  Wound Rectum-DRESSING TYPE: Other (Comment) (packed with 3 pieces of surgifoam) (06/18/18 2300)  Splint/Cast:  ]    Other:  surgifoam packed in rectum. resp even and unlabored.   Pt alert and oriented, cannot move legs due to pre operative spinal injection

## 2018-06-19 NOTE — PERIOP NOTES
Pt dressed self, pt standing at edge of bed and walking around, gait steady. Pt trying to void and drinking water.

## 2018-06-19 NOTE — ED NOTES
Bedside and Verbal shift change report given to Chato Mckinnon RN (oncoming nurse) by Annette Sauceda RN (offgoing nurse). Report included the following information SBAR, Kardex, ED Summary, [facility] and Recent Results.

## 2018-06-19 NOTE — PERIOP NOTES
Pt voided 300 cc clear yellow urine, pt refuses straight cath. Pt discharge instructions given and pt's IV d/c'd. No rectal bleeding noted.

## 2019-05-14 ENCOUNTER — APPOINTMENT (OUTPATIENT)
Dept: CT IMAGING | Age: 74
End: 2019-05-14
Attending: EMERGENCY MEDICINE
Payer: MEDICARE

## 2019-05-14 ENCOUNTER — HOSPITAL ENCOUNTER (EMERGENCY)
Age: 74
Discharge: HOME | End: 2019-05-14
Attending: EMERGENCY MEDICINE
Payer: MEDICARE

## 2019-05-14 VITALS
RESPIRATION RATE: 18 BRPM | HEART RATE: 58 BPM | OXYGEN SATURATION: 98 % | SYSTOLIC BLOOD PRESSURE: 132 MMHG | DIASTOLIC BLOOD PRESSURE: 84 MMHG | TEMPERATURE: 97.8 F

## 2019-05-14 DIAGNOSIS — S09.90XA CLOSED HEAD INJURY, INITIAL ENCOUNTER: Primary | ICD-10-CM

## 2019-05-14 PROCEDURE — 99282 EMERGENCY DEPT VISIT SF MDM: CPT

## 2019-05-14 PROCEDURE — 70450 CT HEAD/BRAIN W/O DYE: CPT

## 2019-05-14 NOTE — DISCHARGE INSTRUCTIONS
Patient Education        Learning About a Closed Head Injury  What is a closed head injury? A closed head injury happens when your head gets hit hard. The strong force of the blow causes your brain to shake in your skull. This movement can cause the brain to bruise, swell, or tear. Sometimes nerves or blood vessels also get damaged. This can cause bleeding in or around the brain. A concussion is a type of closed head injury. What are the symptoms? If you have a mild concussion, you may have a mild headache or feel \"not quite right. \" These symptoms are common. They usually go away over a few days to 4 weeks. But sometimes after a concussion, you feel like you can't function as well as before the injury. And you have new symptoms. This is called postconcussive syndrome. You may:  · Find it harder to solve problems, think, concentrate, or remember. · Have headaches. · Have changes in your sleep patterns, such as not being able to sleep or sleeping all the time. · Have changes in your personality. · Not be interested in your usual activities. · Feel angry or anxious without a clear reason. · Lose your sense of taste or smell. · Be dizzy, lightheaded, or unsteady. It may be hard to stand or walk. How is a closed head injury treated? Any person who may have a concussion needs to see a doctor. Some people have to stay in the hospital to be watched. Others can go home safely. If you go home, follow your doctor's instructions. He or she will tell you if you need someone to watch you closely for the next 24 hours or longer. Rest is the best treatment. Get plenty of sleep at night. And try to rest during the day. · Avoid activities that are physically or mentally demanding. These include housework, exercise, and schoolwork. And don't play video games, send text messages, or use the computer. You may need to change your school or work schedule to be able to avoid these activities.   · Ask your doctor when it's okay to drive, ride a bike, or operate machinery. · Take an over-the-counter pain medicine, such as acetaminophen (Tylenol), ibuprofen (Advil, Motrin), or naproxen (Aleve). Be safe with medicines. Read and follow all instructions on the label. · Check with your doctor before you use any other medicines for pain. · Do not drink alcohol or use illegal drugs. They can slow recovery. They can also increase your risk of getting a second head injury. Follow-up care is a key part of your treatment and safety. Be sure to make and go to all appointments, and call your doctor if you are having problems. It's also a good idea to know your test results and keep a list of the medicines you take. Where can you learn more? Go to http://lai-antoine.info/. Enter E235 in the search box to learn more about \"Learning About a Closed Head Injury. \"  Current as of: Kaylen 3, 2018  Content Version: 11.9  © 6337-7641 [employer]. Care instructions adapted under license by [employer] (which disclaims liability or warranty for this information). If you have questions about a medical condition or this instruction, always ask your healthcare professional. [address] any warranty or liability for your use of this information.

## 2019-05-14 NOTE — ED NOTES
Patient has received discharge instructions from ER MD, verbalizes understanding. Ambulatory upon discharge with female .

## 2019-05-14 NOTE — ED PROVIDER NOTES
The history is provided by the patient. Head Injury The incident occurred 1 to 2 hours ago. He came to the ER via walk-in. The injury mechanism was a direct blow. The volume of blood lost was minimal. Associated symptoms include disorientation. Pertinent negatives include no blurred vision, no vomiting and no memory loss. He has tried applying pressure for the symptoms. There was no loss of consciousness. He has been behaving normally (though he feels like he wants to cry but isn't having much pain). Past Medical History:  
Diagnosis Date  B12 deficiency H/O  
 Chicken pox  DJD (degenerative joint disease)  Hypercholesterolemia  Sciatica Past Surgical History:  
Procedure Laterality Date  ENDOSCOPY, COLON, DIAGNOSTIC  2003  
 normal  
 HX COLONOSCOPY  2015 Dr. Joan Waldron.  HX HEENT Repair nasal fx; circa 1988.  HX OTHER SURGICAL Left chest tube for traumatic pneumothorax; circa 1966.  HX TONSILLECTOMY Circa age 9. Family History:  
Problem Relation Age of Onset  Thyroid Disease Mother   
     hypothyroidism  Hypertension Mother  Thyroid Disease Sister   
     hypothyroidism  Diabetes Father  Hypertension Father  Stroke Father Social History Socioeconomic History  Marital status: SINGLE Spouse name: Not on file  Number of children: Not on file  Years of education: Not on file  Highest education level: Not on file Occupational History  Not on file Social Needs  Financial resource strain: Not on file  Food insecurity:  
  Worry: Not on file Inability: Not on file  Transportation needs:  
  Medical: Not on file Non-medical: Not on file Tobacco Use  Smoking status: Former Smoker  Smokeless tobacco: Never Used Substance and Sexual Activity  Alcohol use: Yes Alcohol/week: 0.0 oz  
  Comment: 1-2  Drug use: Yes Types: Benzodiazepines  Sexual activity: Not Currently Lifestyle  Physical activity:  
  Days per week: Not on file Minutes per session: Not on file  Stress: Not on file Relationships  Social connections:  
  Talks on phone: Not on file Gets together: Not on file Attends Jain service: Not on file Active member of club or organization: Not on file Attends meetings of clubs or organizations: Not on file Relationship status: Not on file  Intimate partner violence:  
  Fear of current or ex partner: Not on file Emotionally abused: Not on file Physically abused: Not on file Forced sexual activity: Not on file Other Topics Concern  Not on file Social History Narrative  Not on file ALLERGIES: Pcn [penicillins] Review of Systems Constitutional: Negative for chills and fever. Eyes: Negative for blurred vision. Respiratory: Negative for shortness of breath. Cardiovascular: Negative for chest pain. Gastrointestinal: Negative for abdominal pain, constipation, diarrhea and vomiting. Neurological: Negative for dizziness and light-headedness. Psychiatric/Behavioral: Negative for memory loss. All other systems reviewed and are negative. Vitals:  
 05/14/19 1809 Pulse: 65 Resp: 18 SpO2: 98% Physical Exam  
Constitutional: He appears well-developed and well-nourished. No distress. HENT:  
Head: Normocephalic and atraumatic. Small abrasion to middle of forehead Eyes: Pupils are equal, round, and reactive to light. Conjunctivae are normal.  
Neck: Normal range of motion. Cardiovascular: Normal rate and regular rhythm. Pulmonary/Chest: Effort normal and breath sounds normal.  
Abdominal: Soft. He exhibits no distension. There is no tenderness. Musculoskeletal: Normal range of motion. Neurological: He is alert. Skin: Skin is dry. Capillary refill takes less than 2 seconds. Psychiatric: He has a normal mood and affect. His behavior is normal.  
Nursing note and vitals reviewed. MDM Number of Diagnoses or Management Options Closed head injury, initial encounter: minor Diagnosis management comments: The patient is resting comfortably and feels better, is alert, talkative, interactive and in no distress. The repeat examination is unremarkable and benign. The patient is neurologically intact, has a normal mental status and is ambulatory in the ED. The history, exam, diagnostic testing (if any) and the patient's current condition do not suggest subarachnoid hemorrhage, intracranial bleeding, or other significant pathology that would warrant further testing, continued ED treatment, admission, neurological consultation, or other specialist evaluation at this point. The vital signs have been stable. The patient's condition is stable and appropriate for discharge. The patient will pursue further outpatient evaluation with the primary care physician or other designated or consulting physician as indicated in the discharge instructions and was given concussion precautions. Procedures

## 2019-05-14 NOTE — ED TRIAGE NOTES
Tai Arnold:  Patient referred from [facility] for head injury. Patient has band-aid to forehead. Patient reports was hit on by a closing trunk door. Patient states \"it almost knocked me out\".

## 2022-03-20 PROBLEM — T18.5XXA FOREIGN BODY IN ANUS AND RECTUM: Status: ACTIVE | Noted: 2018-06-18

## 2024-11-20 ENCOUNTER — TRANSCRIBE ORDERS (OUTPATIENT)
Facility: HOSPITAL | Age: 79
End: 2024-11-20

## 2024-11-20 DIAGNOSIS — I20.9 ANGINA PECTORIS (HCC): Primary | ICD-10-CM

## 2025-05-21 ENCOUNTER — OFFICE VISIT (OUTPATIENT)
Age: 80
End: 2025-05-21

## 2025-05-21 VITALS
HEART RATE: 63 BPM | TEMPERATURE: 98.1 F | BODY MASS INDEX: 26.53 KG/M2 | SYSTOLIC BLOOD PRESSURE: 99 MMHG | DIASTOLIC BLOOD PRESSURE: 66 MMHG | HEIGHT: 67 IN | RESPIRATION RATE: 16 BRPM | WEIGHT: 169 LBS | OXYGEN SATURATION: 95 %

## 2025-05-21 DIAGNOSIS — S51.832A PUNCTURE WOUND OF LEFT FOREARM, INITIAL ENCOUNTER: Primary | ICD-10-CM

## 2025-05-21 NOTE — PATIENT INSTRUCTIONS
Puncture wound to the left forearm -  Tdap updated today  Wound was cleaned and covered with bacitracin and nonstick gauze  Keep area clean and dry, change dressing daily  Showering is okay though avoid submerging the wound in water/soaking  Monitor for signs of infection, including increasing tenderness, surrounding redness, or any drainage  Recommend follow-up with your primary care provider  Call or return to clinic if any concerns

## 2025-05-21 NOTE — PROGRESS NOTES
Jeferson Carpenter (:  1945) is a 79 y.o. male,New patient, here for evaluation of the following chief complaint(s):  Other (Pt in today with a laceration to wrist on left side he was using a drill today the drill silt and went through skin.)        SUBJECTIVE/OBJECTIVE:    History provided by:  Patient       79 y.o. male presents with symptoms of laceration/puncture wound to the medial distal left forearm sustained about an hour and a half prior to arriving.  Patient states he was drilling into a wall when his hand slipped and the drill bit went directly into his distal forearm just near the wrist.  He denies any concern for foreign bodies.  He states the drill bit was not grossly contaminated.  States it bled initially but is better now.  And he presents because he knows he is out of date for his tetanus (per Epic last Tdap was ).  He denies any fevers or chills.  He denies any purulent drainage.  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  He states he can move the wrist normally.  He is not on any blood thinners.         Vitals:    25 1646   BP: 99/66   BP Site: Right Upper Arm   Patient Position: Sitting   BP Cuff Size: Medium Adult   Pulse: 63   Resp: 16   Temp: 98.1 °F (36.7 °C)   TempSrc: Oral   SpO2: 95%   Weight: 76.7 kg (169 lb)   Height: 1.702 m (5' 7\")       No results found for this visit on 25.     Physical Exam  Constitutional:       General: He is not in acute distress.     Appearance: Normal appearance. He is not ill-appearing or toxic-appearing.   HENT:      Head: Normocephalic and atraumatic.   Pulmonary:      Effort: Pulmonary effort is normal. No respiratory distress.   Skin:     Comments: Circular wound to the distal volar left forearm, about 7 to 8 mm in diameter, not actively bleeding.  There is no drainage.  There is no swelling or surrounding erythema.  Wound was irrigated extensively under tap water.  And then covered with nonstick

## (undated) DEVICE — SOLUTION IV 1000ML 0.9% SOD CHL

## (undated) DEVICE — SURGIFOAM SPNG SZ 12-7

## (undated) DEVICE — TOWEL SURG W17XL27IN STD BLU COT NONFENESTRATED PREWASHED

## (undated) DEVICE — SYR 10ML LUER LOK 1/5ML GRAD --

## (undated) DEVICE — COVER,MAYO STAND,STERILE: Brand: MEDLINE

## (undated) DEVICE — TRAY PREP DRY W/ PREM GLV 2 APPL 6 SPNG 2 UNDPD 1 OVERWRAP

## (undated) DEVICE — GLOVE SURG SZ 75 L1212IN FNGR THK138MIL BRN LTX FREE

## (undated) DEVICE — BAG DRNGE 4000ML CONT IRRIG ROUNDED TEARDROP SHP DISP

## (undated) DEVICE — CATHETER URETH 26FR BLLN 5CC LTX 3 W F SPEC SHT RND TIP TWO

## (undated) DEVICE — KENDALL SCD EXPRESS SLEEVES, KNEE LENGTH, MEDIUM: Brand: KENDALL SCD

## (undated) DEVICE — DRAPE,ROBOTICS,STERILE: Brand: MEDLINE

## (undated) DEVICE — DEVON™ KNEE AND BODY STRAP 60" X 3" (1.5 M X 7.6 CM): Brand: DEVON

## (undated) DEVICE — INFECTION CONTROL KIT SYS

## (undated) DEVICE — SPONGE LAP 18X18IN STRL -- 5/PK

## (undated) DEVICE — SYRINGE CATH TIP 50ML